# Patient Record
Sex: FEMALE | Race: OTHER | HISPANIC OR LATINO
[De-identification: names, ages, dates, MRNs, and addresses within clinical notes are randomized per-mention and may not be internally consistent; named-entity substitution may affect disease eponyms.]

---

## 2020-09-02 ENCOUNTER — ASOB RESULT (OUTPATIENT)
Age: 25
End: 2020-09-02

## 2020-09-02 ENCOUNTER — APPOINTMENT (OUTPATIENT)
Dept: ANTEPARTUM | Facility: CLINIC | Age: 25
End: 2020-09-02
Payer: MEDICAID

## 2020-09-02 PROBLEM — Z00.00 ENCOUNTER FOR PREVENTIVE HEALTH EXAMINATION: Status: ACTIVE | Noted: 2020-09-02

## 2020-09-02 PROCEDURE — 76815 OB US LIMITED FETUS(S): CPT

## 2020-09-29 ENCOUNTER — EMERGENCY (EMERGENCY)
Facility: HOSPITAL | Age: 25
LOS: 1 days | Discharge: HOME | End: 2020-09-29
Attending: EMERGENCY MEDICINE
Payer: COMMERCIAL

## 2020-09-29 VITALS
RESPIRATION RATE: 18 BRPM | OXYGEN SATURATION: 100 % | HEART RATE: 77 BPM | WEIGHT: 117.07 LBS | SYSTOLIC BLOOD PRESSURE: 110 MMHG | DIASTOLIC BLOOD PRESSURE: 66 MMHG | TEMPERATURE: 99 F

## 2020-09-29 DIAGNOSIS — O02.1 MISSED ABORTION: ICD-10-CM

## 2020-09-29 LAB
ALBUMIN SERPL ELPH-MCNC: 3.5 G/DL — SIGNIFICANT CHANGE UP (ref 3.3–5.2)
ALP SERPL-CCNC: 99 U/L — SIGNIFICANT CHANGE UP (ref 40–120)
ALT FLD-CCNC: 12 U/L — SIGNIFICANT CHANGE UP
ANION GAP SERPL CALC-SCNC: 14 MMOL/L — SIGNIFICANT CHANGE UP (ref 5–17)
APPEARANCE UR: CLEAR — SIGNIFICANT CHANGE UP
AST SERPL-CCNC: 19 U/L — SIGNIFICANT CHANGE UP
BACTERIA # UR AUTO: ABNORMAL
BASOPHILS # BLD AUTO: 0.02 K/UL — SIGNIFICANT CHANGE UP (ref 0–0.2)
BASOPHILS NFR BLD AUTO: 0.2 % — SIGNIFICANT CHANGE UP (ref 0–2)
BILIRUB SERPL-MCNC: 0.3 MG/DL — LOW (ref 0.4–2)
BILIRUB UR-MCNC: NEGATIVE — SIGNIFICANT CHANGE UP
BLD GP AB SCN SERPL QL: SIGNIFICANT CHANGE UP
BUN SERPL-MCNC: 10 MG/DL — SIGNIFICANT CHANGE UP (ref 8–20)
CALCIUM SERPL-MCNC: 8.4 MG/DL — LOW (ref 8.6–10.2)
CHLORIDE SERPL-SCNC: 104 MMOL/L — SIGNIFICANT CHANGE UP (ref 98–107)
CO2 SERPL-SCNC: 17 MMOL/L — LOW (ref 22–29)
COLOR SPEC: YELLOW — SIGNIFICANT CHANGE UP
CREAT SERPL-MCNC: 0.38 MG/DL — LOW (ref 0.5–1.3)
DIFF PNL FLD: NEGATIVE — SIGNIFICANT CHANGE UP
EOSINOPHIL # BLD AUTO: 0.07 K/UL — SIGNIFICANT CHANGE UP (ref 0–0.5)
EOSINOPHIL NFR BLD AUTO: 0.7 % — SIGNIFICANT CHANGE UP (ref 0–6)
EPI CELLS # UR: SIGNIFICANT CHANGE UP
GLUCOSE SERPL-MCNC: 82 MG/DL — SIGNIFICANT CHANGE UP (ref 70–99)
GLUCOSE UR QL: NEGATIVE MG/DL — SIGNIFICANT CHANGE UP
HCG SERPL-ACNC: 1565 MIU/ML — HIGH
HCT VFR BLD CALC: 40.4 % — SIGNIFICANT CHANGE UP (ref 34.5–45)
HGB BLD-MCNC: 13.4 G/DL — SIGNIFICANT CHANGE UP (ref 11.5–15.5)
IMM GRANULOCYTES NFR BLD AUTO: 0.3 % — SIGNIFICANT CHANGE UP (ref 0–1.5)
KETONES UR-MCNC: NEGATIVE — SIGNIFICANT CHANGE UP
LEUKOCYTE ESTERASE UR-ACNC: ABNORMAL
LYMPHOCYTES # BLD AUTO: 1.87 K/UL — SIGNIFICANT CHANGE UP (ref 1–3.3)
LYMPHOCYTES # BLD AUTO: 19.3 % — SIGNIFICANT CHANGE UP (ref 13–44)
MCHC RBC-ENTMCNC: 29.6 PG — SIGNIFICANT CHANGE UP (ref 27–34)
MCHC RBC-ENTMCNC: 33.2 GM/DL — SIGNIFICANT CHANGE UP (ref 32–36)
MCV RBC AUTO: 89.2 FL — SIGNIFICANT CHANGE UP (ref 80–100)
MONOCYTES # BLD AUTO: 0.59 K/UL — SIGNIFICANT CHANGE UP (ref 0–0.9)
MONOCYTES NFR BLD AUTO: 6.1 % — SIGNIFICANT CHANGE UP (ref 2–14)
NEUTROPHILS # BLD AUTO: 7.13 K/UL — SIGNIFICANT CHANGE UP (ref 1.8–7.4)
NEUTROPHILS NFR BLD AUTO: 73.4 % — SIGNIFICANT CHANGE UP (ref 43–77)
NITRITE UR-MCNC: NEGATIVE — SIGNIFICANT CHANGE UP
PH UR: 6 — SIGNIFICANT CHANGE UP (ref 5–8)
PLATELET # BLD AUTO: 262 K/UL — SIGNIFICANT CHANGE UP (ref 150–400)
POTASSIUM SERPL-MCNC: 3.6 MMOL/L — SIGNIFICANT CHANGE UP (ref 3.5–5.3)
POTASSIUM SERPL-SCNC: 3.6 MMOL/L — SIGNIFICANT CHANGE UP (ref 3.5–5.3)
PROT SERPL-MCNC: 6.7 G/DL — SIGNIFICANT CHANGE UP (ref 6.6–8.7)
PROT UR-MCNC: 15 MG/DL
RBC # BLD: 4.53 M/UL — SIGNIFICANT CHANGE UP (ref 3.8–5.2)
RBC # FLD: 15.3 % — HIGH (ref 10.3–14.5)
RBC CASTS # UR COMP ASSIST: SIGNIFICANT CHANGE UP /HPF (ref 0–4)
SODIUM SERPL-SCNC: 135 MMOL/L — SIGNIFICANT CHANGE UP (ref 135–145)
SP GR SPEC: 1.02 — SIGNIFICANT CHANGE UP (ref 1.01–1.02)
UROBILINOGEN FLD QL: NEGATIVE MG/DL — SIGNIFICANT CHANGE UP
WBC # BLD: 9.71 K/UL — SIGNIFICANT CHANGE UP (ref 3.8–10.5)
WBC # FLD AUTO: 9.71 K/UL — SIGNIFICANT CHANGE UP (ref 3.8–10.5)
WBC UR QL: SIGNIFICANT CHANGE UP

## 2020-09-29 PROCEDURE — 99285 EMERGENCY DEPT VISIT HI MDM: CPT

## 2020-09-29 PROCEDURE — 76805 OB US >/= 14 WKS SNGL FETUS: CPT | Mod: 26

## 2020-09-29 PROCEDURE — 99284 EMERGENCY DEPT VISIT MOD MDM: CPT

## 2020-09-29 PROCEDURE — 86900 BLOOD TYPING SEROLOGIC ABO: CPT

## 2020-09-29 PROCEDURE — 84702 CHORIONIC GONADOTROPIN TEST: CPT

## 2020-09-29 PROCEDURE — 80053 COMPREHEN METABOLIC PANEL: CPT

## 2020-09-29 PROCEDURE — 86901 BLOOD TYPING SEROLOGIC RH(D): CPT

## 2020-09-29 PROCEDURE — 81001 URINALYSIS AUTO W/SCOPE: CPT

## 2020-09-29 PROCEDURE — T1013: CPT

## 2020-09-29 PROCEDURE — 85025 COMPLETE CBC W/AUTO DIFF WBC: CPT

## 2020-09-29 PROCEDURE — 87086 URINE CULTURE/COLONY COUNT: CPT

## 2020-09-29 PROCEDURE — 86850 RBC ANTIBODY SCREEN: CPT

## 2020-09-29 PROCEDURE — 36415 COLL VENOUS BLD VENIPUNCTURE: CPT

## 2020-09-29 PROCEDURE — 76805 OB US >/= 14 WKS SNGL FETUS: CPT

## 2020-09-29 NOTE — ED STATDOCS - PATIENT PORTAL LINK FT
You can access the FollowMyHealth Patient Portal offered by [facility] by registering at the following website: [email or web address]. By joining [employer]’s FollowMyHealth portal, you will also be able to view your health information using other applications (apps) compatible with our system.

## 2020-09-29 NOTE — ED ADULT TRIAGE NOTE - CHIEF COMPLAINT QUOTE
Pt 18 weeks gestation sent from clinic for abnormal ultrasound that showed no fetal heartrate, pt in no apparent distress and denies any pregnancy complications.

## 2020-09-29 NOTE — ED STATDOCS - PHYSICAL EXAMINATION
VITAL SIGNS: I have reviewed nursing notes and confirm.  CONSTITUTIONAL: Well-developed; well-nourished; in no acute distress.  SKIN: Skin exam is warm and dry, no acute rash.  HEAD: Normocephalic; atraumatic.  EYES: PERRL, EOM intact; conjunctiva and sclera clear.  ENT: No nasal discharge; airway clear. Throat clear.  NECK: Supple; non tender.    CARD: S1, S2 normal; no murmurs, gallops, or rubs. Regular rate and rhythm.  RESP: No wheezes,  no rales or rhonchi.   ABD:  Gravid abd soft; non-distended; non-tender;   EXT: Normal ROM. No clubbing, cyanosis or edema.  NEURO: Alert, oriented. Grossly unremarkable. No focal deficits. no facial droop, moves all extremities,

## 2020-09-29 NOTE — ED STATDOCS - NS ED ROS FT
Review of Systems  •	CONSTITUTIONAL - no  fever, no diaphoresis, no weight change  •	SKIN - no rash  •	HEMATOLOGIC - no bleeding, no bruising  •	EYES - no eye pain, no blurred vision  •	ENT - no change in hearing, no pain  •	RESPIRATORY - no shortness of breath, no cough  •	CARDIAC - no chest pain, no palpitations  •	GI - no abd pain, no nausea, no vomiting, no diarrhea, no constipation, no bleeding  •	GENITO-URINARY - no discharge, no dysuria; no hematuria,   •	ENDO - no polydypsia, no polyurea, no heat/no cold intolerance  •	MUSCULOSKELETAL - no joint pain, no swelling, no redness  •	NEUROLOGIC - no weakness, no headache, no anesthesia, no paresthesias  •	PSYCH - no anxiety, non suicidal, non homicidal, no hallucination, no depression

## 2020-09-29 NOTE — ED STATDOCS - OBJECTIVE STATEMENT
24 y/o F pt 18 weeks pregnant with no significant PMHx presents to the ED s/p US at a [facility] clinic that showed no fetal heartrate. She states that she was there for a routine checkup and US. Denies associated fever, chills, SOB, abdominal pain, vaginal bleeding, nausea, vomiting, diarrhea, dizziness at the time of evaluation.  : Anila

## 2020-09-29 NOTE — ED STATDOCS - ATTENDING CONTRIBUTION TO CARE
I, Tl Anderson, performed the initial face to face bedside interview with this patient regarding history of present illness, review of symptoms and relevant past medical, social and family history.  I completed an independent physical examination.  I was the initial provider who evaluated this patient. I have signed out the follow up of any pending tests (i.e. labs, radiological studies) to the ACP.  I have communicated the patient’s plan of care and disposition with the ACP.

## 2020-09-29 NOTE — CONSULT NOTE ADULT - ASSESSMENT
RIVKA WEBBER is a 24yo G2 now  found in ED to have imaging consistent with fetal demise    - asymptomatic and hemodynamically stable  - no acute GYN management needed at this time  - given gestational age at 18w3d with fetal size on ultrasound measuring 15w4d, we referred her to [facility] for non-emergent surgical management of fetal demise; she plans to walk-in clinic tomorrow AM  - patient counseled on fetal demise and endorses understanding   - patient counseled on signs/symptoms meriting return to emergency room  - to return to [facility] clinic for follow-up after surgery for evaluation of recurrent pregnancy loss in second trimester    Discussed with Dr Arrington

## 2020-09-29 NOTE — CONSULT NOTE ADULT - SUBJECTIVE AND OBJECTIVE BOX
RIVKA WEBBER is a 26yo G2 now  at 18w3d by 14 week ultrasound not consistent with LMP 4/10/20; referred to ED from [facility] clinic, where she had presented for routine OB visit, asymptomatic, and found to have no fetal heart rate on ultrasound.     TVUS in ED showing fetal size measuring 15w4d and no fetal heartbeat consistent with fetal demise.    She denies fevers, chills, nausea, vomiting, abdominal pain, vaginal bleeding or discharge.    Pregnancy course: uncomplicated    OBhx   G1: 2017 SAB at 15 weeks; managed medically at [facility]    PMH: denies  PSH: denies  Rx: PNV  All: NKDA       Weight (kg): 53.1 (20 @ 13:58)  T(C): 37.2 (20 @ 13:58), Max: 37.2 (20 @ 13:58)  HR: 77 (20 @ 13:58) (77 - 77)  BP: 110/66 (20 @ 13:58) (110/66 - 110/66)  RR: 18 (20 @ 13:58) (18 - 18)  SpO2: 100% (20 @ 13:58) (100% - 100%)    Physical Exam:  Gen: alert oriented no acute distress  HEENT: atraumatic, normocephalic  CV: Regular rate rhythm  Pulm: clear to auscultation bilaterally  Abd: soft non-tender, no surgical incisions,+ BS  Gyn: deferred  Msk: no erythema or swelling          135  |  104  |  10.0  ----------------------------<  82  3.6   |  17.0<L>  |  0.38<L>    Ca    8.4<L>      29 Sep 2020 14:49    TPro  6.7  /  Alb  3.5  /  TBili  0.3<L>  /  DBili  x   /  AST  19  /  ALT  12  /  AlkPhos  99      LIVER FUNCTIONS - ( 29 Sep 2020 14:49 )  Alb: 3.5 g/dL / Pro: 6.7 g/dL / ALK PHOS: 99 U/L / ALT: 12 U/L / AST: 19 U/L / GGT: x           Urinalysis Basic - ( 29 Sep 2020 14:30 )    Color: Yellow / Appearance: Clear / S.020 / pH: x  Gluc: x / Ketone: Negative  / Bili: Negative / Urobili: Negative mg/dL   Blood: x / Protein: 15 mg/dL / Nitrite: Negative   Leuk Esterase: Trace / RBC: 0-2 /HPF / WBC 0-2   Sq Epi: x / Non Sq Epi: Few / Bacteria: Occasional      HCG Quantitative, Serum: 1565.0 mIU/mL (20 @ 14:49)

## 2020-09-29 NOTE — CONSULT NOTE ADULT - ATTENDING COMMENTS
15wks IUFD by sono.  by LMP 18wks GA. pt denies abd pain, no vag bleeding.  prior hx of IUFD at 15wks that expelled spontaneously.  pt very disturbed with finding today.  as fetus is measuring 15wks so D&E is needed  Referred to [place] for D&E.  pt advised to go to ED in case of heavy bleeding, abd pain.  pt is Rh positive  after the procedure f/up in [facility]

## 2020-09-30 LAB
CULTURE RESULTS: NO GROWTH — SIGNIFICANT CHANGE UP
SPECIMEN SOURCE: SIGNIFICANT CHANGE UP

## 2020-10-07 ENCOUNTER — APPOINTMENT (OUTPATIENT)
Dept: ANTEPARTUM | Facility: CLINIC | Age: 25
End: 2020-10-07
Payer: MEDICAID

## 2020-10-07 ENCOUNTER — ASOB RESULT (OUTPATIENT)
Age: 25
End: 2020-10-07

## 2020-10-07 PROCEDURE — 76815 OB US LIMITED FETUS(S): CPT

## 2023-06-06 NOTE — ED ADULT TRIAGE NOTE - TEMPERATURE IN CELSIUS (DEGREES C)
[FreeTextEntry1] : Gait: Presents ambulating independently without signs of antalgia.  Good coordination and balance noted.\par GENERAL: alert, cooperative, in NAD\par SKIN: The skin is intact, warm, pink and dry over the area examined.\par EYES: Normal conjunctiva, normal eyelids and pupils were equal and round.\par ENT: normal ears, normal nose and normal lips.\par CARDIOVASCULAR: brisk capillary refill, but no peripheral edema.\par RESPIRATORY: The patient is in no apparent respiratory distress. They're taking full deep breaths without use of accessory muscles or evidence of audible wheezes or stridor without the use of a stethoscope. Normal respiratory effort.\par ABDOMEN: not examined\par \par focused exam LUE\par - Cast is clean, dry, intact. Good condition.\par - No skin irritation or breakdown at the cast edges\par - Able to actively flex and extend all fingers\par - Able to perform a thumbs up maneuver (PIN), OK sign (AIN), finger crossover (ulnar)\par - Fingers are warm and appear well perfused with brisk capillary refill\par - Examination of pulses is deferred due to overlying cast material\par - Sensation is grossly intact to all exposed portions of the upper extremity\par - No evidence of lymphedema\par 
37.2